# Patient Record
Sex: MALE | Race: ASIAN | NOT HISPANIC OR LATINO
[De-identification: names, ages, dates, MRNs, and addresses within clinical notes are randomized per-mention and may not be internally consistent; named-entity substitution may affect disease eponyms.]

---

## 2023-08-28 PROBLEM — Z00.00 ENCOUNTER FOR PREVENTIVE HEALTH EXAMINATION: Status: ACTIVE | Noted: 2023-08-28

## 2023-09-14 ENCOUNTER — APPOINTMENT (OUTPATIENT)
Dept: THORACIC SURGERY | Facility: CLINIC | Age: 68
End: 2023-09-14
Payer: MEDICARE

## 2023-09-14 VITALS
TEMPERATURE: 97.8 F | RESPIRATION RATE: 16 BRPM | SYSTOLIC BLOOD PRESSURE: 137 MMHG | DIASTOLIC BLOOD PRESSURE: 74 MMHG | HEART RATE: 77 BPM | OXYGEN SATURATION: 98 % | WEIGHT: 129 LBS | HEIGHT: 63.39 IN | BODY MASS INDEX: 22.57 KG/M2

## 2023-09-14 PROCEDURE — 99205 OFFICE O/P NEW HI 60 MIN: CPT

## 2023-09-14 RX ORDER — ERGOCALCIFEROL 1.25 MG/1
1.25 MG CAPSULE, LIQUID FILLED ORAL
Refills: 0 | Status: ACTIVE | COMMUNITY

## 2023-09-14 RX ORDER — BUDESONIDE, GLYCOPYRROLATE, AND FORMOTEROL FUMARATE 160; 9; 4.8 UG/1; UG/1; UG/1
160-9-4.8 AEROSOL, METERED RESPIRATORY (INHALATION)
Refills: 0 | Status: ACTIVE | COMMUNITY

## 2023-09-27 ENCOUNTER — OUTPATIENT (OUTPATIENT)
Dept: OUTPATIENT SERVICES | Facility: HOSPITAL | Age: 68
LOS: 1 days | End: 2023-09-27

## 2023-09-27 VITALS
HEIGHT: 62 IN | SYSTOLIC BLOOD PRESSURE: 140 MMHG | WEIGHT: 130.07 LBS | RESPIRATION RATE: 16 BRPM | HEART RATE: 80 BPM | TEMPERATURE: 97 F | DIASTOLIC BLOOD PRESSURE: 70 MMHG | OXYGEN SATURATION: 99 %

## 2023-09-27 DIAGNOSIS — J45.909 UNSPECIFIED ASTHMA, UNCOMPLICATED: ICD-10-CM

## 2023-09-27 DIAGNOSIS — R91.8 OTHER NONSPECIFIC ABNORMAL FINDING OF LUNG FIELD: ICD-10-CM

## 2023-09-27 RX ORDER — SODIUM CHLORIDE 9 MG/ML
1000 INJECTION, SOLUTION INTRAVENOUS
Refills: 0 | Status: DISCONTINUED | OUTPATIENT
Start: 2023-10-04 | End: 2023-10-18

## 2023-09-27 NOTE — H&P PST ADULT - FUNCTIONAL STATUS
<-- Click to add NO significant Past Surgical History Mets 4 , Walks 3 to 4 blocks, climbs 2 flight of stairs, ADLs

## 2023-09-27 NOTE — H&P PST ADULT - PROBLEM SELECTOR PLAN 1
Patient tentatively scheduled for flexible bronchoscopy endobronchial ultrasound guided biopsy on 10/4/23.  Pre-op instructions provided. Pt given verbal and written instructions with teach back on pepcid. Pt verbalized understanding with return demonstration.  MARY precautions.      Recent labs from 9/18/23 (CBC, CMP, PT, PTT INR) in chart  Recent EKG in chart.  Patient obtained medical evaluation , Copy in chart.

## 2023-10-04 ENCOUNTER — RESULT REVIEW (OUTPATIENT)
Age: 68
End: 2023-10-04

## 2023-10-04 ENCOUNTER — APPOINTMENT (OUTPATIENT)
Dept: THORACIC SURGERY | Facility: HOSPITAL | Age: 68
End: 2023-10-04

## 2023-10-04 ENCOUNTER — OUTPATIENT (OUTPATIENT)
Dept: OUTPATIENT SERVICES | Facility: HOSPITAL | Age: 68
LOS: 1 days | Discharge: HOME | End: 2023-10-04
Payer: MEDICARE

## 2023-10-04 ENCOUNTER — TRANSCRIPTION ENCOUNTER (OUTPATIENT)
Age: 68
End: 2023-10-04

## 2023-10-04 VITALS
WEIGHT: 130.07 LBS | HEART RATE: 62 BPM | SYSTOLIC BLOOD PRESSURE: 127 MMHG | TEMPERATURE: 98 F | OXYGEN SATURATION: 98 % | DIASTOLIC BLOOD PRESSURE: 67 MMHG | HEIGHT: 62 IN | RESPIRATION RATE: 16 BRPM

## 2023-10-04 VITALS
RESPIRATION RATE: 18 BRPM | OXYGEN SATURATION: 100 % | SYSTOLIC BLOOD PRESSURE: 119 MMHG | DIASTOLIC BLOOD PRESSURE: 67 MMHG | HEART RATE: 72 BPM | TEMPERATURE: 97 F

## 2023-10-04 DIAGNOSIS — R91.8 OTHER NONSPECIFIC ABNORMAL FINDING OF LUNG FIELD: ICD-10-CM

## 2023-10-04 PROCEDURE — 31633 BRONCHOSCOPY/NEEDLE BX ADDL: CPT

## 2023-10-04 PROCEDURE — 88173 CYTOPATH EVAL FNA REPORT: CPT | Mod: 26

## 2023-10-04 PROCEDURE — 31624 DX BRONCHOSCOPE/LAVAGE: CPT

## 2023-10-04 PROCEDURE — 88305 TISSUE EXAM BY PATHOLOGIST: CPT | Mod: 26

## 2023-10-04 PROCEDURE — 71045 X-RAY EXAM CHEST 1 VIEW: CPT | Mod: 26

## 2023-10-04 PROCEDURE — 88112 CYTOPATH CELL ENHANCE TECH: CPT | Mod: 26,59

## 2023-10-04 PROCEDURE — 31645 BRNCHSC W/THER ASPIR 1ST: CPT

## 2023-10-04 PROCEDURE — 31629 BRONCHOSCOPY/NEEDLE BX EACH: CPT

## 2023-10-04 PROCEDURE — 31652 BRONCH EBUS SAMPLNG 1/2 NODE: CPT

## 2023-10-04 RX ORDER — HYDROMORPHONE HYDROCHLORIDE 2 MG/ML
0.5 INJECTION INTRAMUSCULAR; INTRAVENOUS; SUBCUTANEOUS
Refills: 0 | Status: DISCONTINUED | OUTPATIENT
Start: 2023-10-04 | End: 2023-10-04

## 2023-10-04 RX ORDER — ONDANSETRON 8 MG/1
4 TABLET, FILM COATED ORAL ONCE
Refills: 0 | Status: DISCONTINUED | OUTPATIENT
Start: 2023-10-04 | End: 2023-10-18

## 2023-10-04 RX ORDER — HYDROMORPHONE HYDROCHLORIDE 2 MG/ML
1 INJECTION INTRAMUSCULAR; INTRAVENOUS; SUBCUTANEOUS
Refills: 0 | Status: DISCONTINUED | OUTPATIENT
Start: 2023-10-04 | End: 2023-10-04

## 2023-10-04 NOTE — BRIEF OPERATIVE NOTE - NSICDXBRIEFPROCEDURE_GEN_ALL_CORE_FT
PROCEDURES:  Bronchoscopy, with EBUS and 1 or 2 lymph node sampling 04-Oct-2023 16:01:51  Kalani Montenegro  Flexible bronchoscopy with bronchoalveolar lavage 04-Oct-2023 16:02:16  Kalani Montenegro

## 2023-10-04 NOTE — ASU DISCHARGE PLAN (ADULT/PEDIATRIC) - CARE PROVIDER_API CALL
Mj Montejo  Thoracic Surgery  [phone] [place] [address] [place] [address]  Phone: [phone]  Fax: [phone]  Follow Up Time: 2 weeks

## 2023-10-04 NOTE — ASU DISCHARGE PLAN (ADULT/PEDIATRIC) - ASU DC SPECIAL INSTRUCTIONSFT
You may cough up blood-tinged sputum in the first 2-3 days after the procedure, this is expected and will subside with time.   If you start to cough up large amounts of sputum, please call Dr. Montejo's office and visit the nearest emergency room for further evaluation.    Please follow up with Dr. Montejo in 2 weeks from the procedure by calling the office to make an appointment.

## 2023-10-04 NOTE — BRIEF OPERATIVE NOTE - COMMENTS
FADUMO Martinez, provided direct first assist support to the surgeon during this surgical procedure. My involvement included positioning, prepping and draping the patient prior to surgery, ensuring clear visibility and exposure for the surgeon by using instruments such as retractors and suction, closing surgical incisions and dressing wounds. As well as other tasks as directed by the surgeon.

## 2023-10-04 NOTE — ASU DISCHARGE PLAN (ADULT/PEDIATRIC) - BATHING
Patient very reluctant to schedule appt for hospital follow up.  Pts dtr brings him to appt so was unable to schedule him until 8/7/18 [facility]
No change

## 2023-10-05 LAB
GRAM STN FLD: SIGNIFICANT CHANGE UP
NIGHT BLUE STAIN TISS: SIGNIFICANT CHANGE UP
SPECIMEN SOURCE: SIGNIFICANT CHANGE UP
SPECIMEN SOURCE: SIGNIFICANT CHANGE UP

## 2023-10-06 LAB — NON-GYNECOLOGICAL CYTOLOGY STUDY: SIGNIFICANT CHANGE UP

## 2023-10-08 LAB
-  CEFTRIAXONE (MENINGITIDIS): SIGNIFICANT CHANGE UP
-  CEFTRIAXONE (NON-MENINGITIDIS): SIGNIFICANT CHANGE UP
-  ERYTHROMYCIN: SIGNIFICANT CHANGE UP
-  LEVOFLOXACIN: SIGNIFICANT CHANGE UP
-  PENICILLIN (MENINGITIDIS): SIGNIFICANT CHANGE UP
-  PENICILLIN (NON-MENINGITIDIS): SIGNIFICANT CHANGE UP
-  PENICILLIN (ORAL PENICILLIN V): SIGNIFICANT CHANGE UP
-  TRIMETHOPRIM/SULFAMETHOXAZOLE: SIGNIFICANT CHANGE UP
-  VANCOMYCIN: SIGNIFICANT CHANGE UP
METHOD TYPE: SIGNIFICANT CHANGE UP

## 2023-10-09 LAB
CULTURE RESULTS: SIGNIFICANT CHANGE UP
ORGANISM # SPEC MICROSCOPIC CNT: SIGNIFICANT CHANGE UP
ORGANISM # SPEC MICROSCOPIC CNT: SIGNIFICANT CHANGE UP
SPECIMEN SOURCE: SIGNIFICANT CHANGE UP

## 2023-10-10 PROBLEM — J45.909 UNSPECIFIED ASTHMA, UNCOMPLICATED: Chronic | Status: ACTIVE | Noted: 2023-09-27

## 2023-10-10 PROBLEM — E78.5 HYPERLIPIDEMIA, UNSPECIFIED: Chronic | Status: ACTIVE | Noted: 2023-09-27

## 2023-10-12 ENCOUNTER — APPOINTMENT (OUTPATIENT)
Dept: THORACIC SURGERY | Facility: CLINIC | Age: 68
End: 2023-10-12
Payer: MEDICARE

## 2023-10-12 VITALS
SYSTOLIC BLOOD PRESSURE: 136 MMHG | RESPIRATION RATE: 18 BRPM | BODY MASS INDEX: 22.75 KG/M2 | WEIGHT: 130 LBS | OXYGEN SATURATION: 96 % | HEART RATE: 86 BPM | DIASTOLIC BLOOD PRESSURE: 74 MMHG | TEMPERATURE: 97.5 F

## 2023-10-12 PROCEDURE — 99214 OFFICE O/P EST MOD 30 MIN: CPT

## 2023-11-04 LAB
CULTURE RESULTS: SIGNIFICANT CHANGE UP
CULTURE RESULTS: SIGNIFICANT CHANGE UP
SPECIMEN SOURCE: SIGNIFICANT CHANGE UP
SPECIMEN SOURCE: SIGNIFICANT CHANGE UP

## 2024-01-04 ENCOUNTER — APPOINTMENT (OUTPATIENT)
Dept: THORACIC SURGERY | Facility: CLINIC | Age: 69
End: 2024-01-04
Payer: MEDICARE

## 2024-01-04 ENCOUNTER — NON-APPOINTMENT (OUTPATIENT)
Age: 69
End: 2024-01-04

## 2024-01-04 VITALS
OXYGEN SATURATION: 96 % | RESPIRATION RATE: 18 BRPM | HEART RATE: 84 BPM | WEIGHT: 134 LBS | TEMPERATURE: 96.8 F | HEIGHT: 63.39 IN | DIASTOLIC BLOOD PRESSURE: 76 MMHG | BODY MASS INDEX: 23.45 KG/M2 | SYSTOLIC BLOOD PRESSURE: 157 MMHG

## 2024-01-04 DIAGNOSIS — R91.8 OTHER NONSPECIFIC ABNORMAL FINDING OF LUNG FIELD: ICD-10-CM

## 2024-01-04 DIAGNOSIS — R59.0 LOCALIZED ENLARGED LYMPH NODES: ICD-10-CM

## 2024-01-04 PROCEDURE — 99214 OFFICE O/P EST MOD 30 MIN: CPT

## 2024-01-05 NOTE — DATA REVIEWED
[FreeTextEntry1] : I have independently reviewed the following: [place] Chest on 8[place]21[place]23 [place] [facility] [place]s on 08/25/2023: FVC 71%, FEV1 51%, DLCO 116% PET/CT on 09/20/2023 path on 10/04/2023

## 2024-01-05 NOTE — CONSULT LETTER
[FreeTextEntry2] : Dr. Heath Yepez (PCP/Referring) Dr. Mike Nesbitt (Pulm) [FreeTextEntry3] : Mj Montejo MD, MPH  System Director of Thoracic Surgery  Director of Comprehensive Lung and Foregut [place]  Professor Cardiovascular & Thoracic Surgery   [facility] [employer] [facility] at [facility] [phone] [place] [address] [place] [address] Tel: [phone] Fax: [phone]

## 2024-01-05 NOTE — ASSESSMENT
[FreeTextEntry1] : Mr. PETER ALANIS, 68 year old male, former smoker (1ppd since age 14, quit July 2023), w/ no significant PMHx, who presented with lung nodules, referred by PCP Dr. Heath Yepez.   I have reviewed the patient's medical records and diagnostic images at time of this office consultation and have made the following recommendation: 1. I have reviewed his PFTs and CT and PET scans again, mediastinal LNs were negative from the Flex Bronch EBUS biopsy back in Oct, thus he likely has early stage lung malignancy, and tumor resection remains the Gold Standard, I recommended a repeat CT Chest w/o contrast before scheduling his surgery since last scan was in Aug/Sept. I will then schedule a Rt VATS R.A. RLL wedge rxn, MLND on 1/29/24. Risks and benefits and alternatives explained to patient, all questions answered, patient agreed to proceed with surgery. 2. Medical clearance and PST.   I, RAMÓN Vicente, personally performed the evaluation and management (E/M) services for this established patient who presents today with (a) new problem(s)/exacerbation of (an) existing condition(s). That E/M includes conducting the examination, assessing all new/exacerbated conditions, and establishing a new plan of care. Today, my ACP, Aric Morrell NP was here to observe my evaluation and management services for this new problem/exacerbated condition to be followed going forward.

## 2024-01-05 NOTE — HISTORY OF PRESENT ILLNESS
[FreeTextEntry1] : Mr. PETER ALANIS, 68 year old male, former smoker (1ppd since age 14, quit July 2023), w/ no significant PMHx, who presented with lung nodules. CT lung cancer screening ordered due to smoking hx, referred by PCP Dr. Heath Yepez.  [place] 8[place]21[place]23 [place] [facility] [place] - mildly enlarged mediastinal lymph nodes unchanged:  subcarinal lymph node measuring 1.5 cm; a 1.3 cm precarinal lymph node (2: 56). There is no hilar lymphadenopathy. - numerous small lung nodules in the lung apices, measuring up to 4 mm (3: 27) they are not significantly changed in size and number, likely postinflammatory/infectious.  - small subpleural nodule seen more inferiorly in the upper lobes:  stable 3 mm nodule CL (3: 56) - a faint 8 mm subsolid RLL nodule (3: 161), predominantly groundglass with a punctate nodular component, previously measuring 6 mm, increased in size and density, suspicious for low-grade malignancy - mild emphysema - a hepatic cyst - No suspicious lytic or blastic lesion is identified.  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 Although nonspecific these are typical of inflammation associated with prior granulomatous disease. - Again noted is an 8 mm subsolid pulmonary nodule in the right lower lobe on image 74, no significant uptake on PET. - Again noted are multiple subcentimeter pulmonary nodules measuring up to 4 mm. These are too small for the resolution of PET although likely inflammatory in nature  Patient is s/p FB with BAL of RLL bronchus. EBUS, FNA of right level IV LNs. FNA of Level VII LNs. FB with aspiration of secretions on 10/04/2023. Path of 4R, Level 7 LN and RLL BAL revealed negative for malignant cells.   Last seen 10/12/23, I recommended a Rt VATS R.A. RLL wedge rxn, MLND, but patient is returning to [place] next week and returning to [place] in Feb 2024, I recommended patient to repeat CT Chest w/o contrast in Feb 2024, then RTC to discuss and set a date for surgery.  *Patient decided not to go back to [place], wants to proceed with surgery now. Patient is here today for a follow up. Denies SOB, CP, cough.

## 2024-01-18 ENCOUNTER — OUTPATIENT (OUTPATIENT)
Dept: OUTPATIENT SERVICES | Facility: HOSPITAL | Age: 69
LOS: 1 days | End: 2024-01-18
Payer: MEDICARE

## 2024-01-18 VITALS
SYSTOLIC BLOOD PRESSURE: 133 MMHG | HEART RATE: 83 BPM | OXYGEN SATURATION: 95 % | TEMPERATURE: 97 F | RESPIRATION RATE: 16 BRPM | DIASTOLIC BLOOD PRESSURE: 85 MMHG | WEIGHT: 130.95 LBS | HEIGHT: 62.5 IN

## 2024-01-18 DIAGNOSIS — Z91.89 OTHER SPECIFIED PERSONAL RISK FACTORS, NOT ELSEWHERE CLASSIFIED: ICD-10-CM

## 2024-01-18 DIAGNOSIS — R91.8 OTHER NONSPECIFIC ABNORMAL FINDING OF LUNG FIELD: ICD-10-CM

## 2024-01-18 DIAGNOSIS — Z98.890 OTHER SPECIFIED POSTPROCEDURAL STATES: Chronic | ICD-10-CM

## 2024-01-18 DIAGNOSIS — I10 ESSENTIAL (PRIMARY) HYPERTENSION: ICD-10-CM

## 2024-01-18 DIAGNOSIS — J45.909 UNSPECIFIED ASTHMA, UNCOMPLICATED: ICD-10-CM

## 2024-01-18 LAB
BLD GP AB SCN SERPL QL: NEGATIVE — SIGNIFICANT CHANGE UP
RH IG SCN BLD-IMP: POSITIVE — SIGNIFICANT CHANGE UP
RH IG SCN BLD-IMP: POSITIVE — SIGNIFICANT CHANGE UP

## 2024-01-18 RX ORDER — SODIUM CHLORIDE 9 MG/ML
1000 INJECTION, SOLUTION INTRAVENOUS
Refills: 0 | Status: DISCONTINUED | OUTPATIENT
Start: 2024-01-29 | End: 2024-01-30

## 2024-01-18 NOTE — H&P PST ADULT - RESPIRATORY AND THORAX COMMENTS
asthma - no recent exacerbations, last used Breztri about 2-3 months ago, preop dx other nonspecific abnormal finding of lung field

## 2024-01-18 NOTE — H&P PST ADULT - HISTORY OF PRESENT ILLNESS
70 y/o male PMH HTN HLD prediabetes asthma presents to presurgical testing with diagnosis of other nonspecific abnormal finding of lung field. Pt with history of pulmonary nodules. Pt is scheduled for a right video assisted thoracoscopy robotic assisted wedge resection of right lower lobe mediastinal lymph node dissection.

## 2024-01-18 NOTE — H&P PST ADULT - NSANTHTOTALSCORECAL_ENT_A_CORE
Detail Level: Zone
Plan: due to active hair growth, no tx is needed today. Discussed etiologies/prognosis/and treatment plan w/ patient and patient's wife.
4

## 2024-01-18 NOTE — H&P PST ADULT - PROBLEM SELECTOR PLAN 1
Patient tentatively scheduled for right video assisted thoracoscopy robotic assisted wedge resection of right lower lobe mediastinal lymph node dissection for 1/29/24. Pre-op instructions provided via Mandarin . Pt given verbal and written instructions with teach back on chlorhexidine shampoo (given Mandarin handout) and pepcid. Pt verbalized understanding with return demonstration.    Copy of medical and cardiac evaluation in chart with labs CBC CMP UA COAGS EKG from 1/6/24  Pending copy of recent echo and stress test.    T&S/ABO done

## 2024-01-18 NOTE — H&P PST ADULT - NSICDXPASTMEDICALHX_GEN_ALL_CORE_FT
PAST MEDICAL HISTORY:  Asthma     HLD (hyperlipidemia)     HTN (hypertension)     Prediabetes     Pulmonary nodules

## 2024-01-26 NOTE — ASU PATIENT PROFILE, ADULT - PATIENT REPRESENTATIVE NAME
H&P reviewed  After examining the patient I find no changes in the patients condition since the H&P had been written      Vitals:    09/23/20 1028   BP: 133/64   Pulse: 82   Resp: 18   Temp: 99 °F (37 2 °C)   SpO2: 95%
félix/daughter

## 2024-01-28 ENCOUNTER — TRANSCRIPTION ENCOUNTER (OUTPATIENT)
Age: 69
End: 2024-01-28

## 2024-01-29 ENCOUNTER — TRANSCRIPTION ENCOUNTER (OUTPATIENT)
Age: 69
End: 2024-01-29

## 2024-01-29 ENCOUNTER — RESULT REVIEW (OUTPATIENT)
Age: 69
End: 2024-01-29

## 2024-01-29 ENCOUNTER — INPATIENT (INPATIENT)
Facility: HOSPITAL | Age: 69
LOS: 0 days | Discharge: HOME | End: 2024-01-30
Attending: THORACIC SURGERY (CARDIOTHORACIC VASCULAR SURGERY) | Admitting: THORACIC SURGERY (CARDIOTHORACIC VASCULAR SURGERY)
Payer: MEDICARE

## 2024-01-29 ENCOUNTER — APPOINTMENT (OUTPATIENT)
Dept: THORACIC SURGERY | Facility: HOSPITAL | Age: 69
End: 2024-01-29

## 2024-01-29 VITALS
RESPIRATION RATE: 16 BRPM | SYSTOLIC BLOOD PRESSURE: 130 MMHG | HEART RATE: 71 BPM | HEIGHT: 62.5 IN | WEIGHT: 130.95 LBS | DIASTOLIC BLOOD PRESSURE: 81 MMHG | TEMPERATURE: 98 F | OXYGEN SATURATION: 99 %

## 2024-01-29 DIAGNOSIS — Z98.890 OTHER SPECIFIED POSTPROCEDURAL STATES: Chronic | ICD-10-CM

## 2024-01-29 DIAGNOSIS — R91.8 OTHER NONSPECIFIC ABNORMAL FINDING OF LUNG FIELD: ICD-10-CM

## 2024-01-29 PROCEDURE — 32652 THORACOSCOPY REM TOTL CORTEX: CPT | Mod: AS,RT

## 2024-01-29 PROCEDURE — 32666 THORACOSCOPY W/WEDGE RESECT: CPT | Mod: RT

## 2024-01-29 PROCEDURE — 32667 THORACOSCOPY W/W RESECT ADDL: CPT | Mod: RT

## 2024-01-29 PROCEDURE — 32652 THORACOSCOPY REM TOTL CORTEX: CPT | Mod: RT

## 2024-01-29 PROCEDURE — 88305 TISSUE EXAM BY PATHOLOGIST: CPT | Mod: 26

## 2024-01-29 PROCEDURE — 32666 THORACOSCOPY W/WEDGE RESECT: CPT | Mod: AS,RT

## 2024-01-29 PROCEDURE — 88307 TISSUE EXAM BY PATHOLOGIST: CPT | Mod: 26

## 2024-01-29 PROCEDURE — S2900 ROBOTIC SURGICAL SYSTEM: CPT | Mod: NC

## 2024-01-29 PROCEDURE — 71045 X-RAY EXAM CHEST 1 VIEW: CPT | Mod: 26

## 2024-01-29 PROCEDURE — 32674 THORACOSCOPY LYMPH NODE EXC: CPT | Mod: AS

## 2024-01-29 PROCEDURE — 99233 SBSQ HOSP IP/OBS HIGH 50: CPT

## 2024-01-29 PROCEDURE — 32674 THORACOSCOPY LYMPH NODE EXC: CPT

## 2024-01-29 PROCEDURE — 32667 THORACOSCOPY W/W RESECT ADDL: CPT | Mod: AS,RT

## 2024-01-29 DEVICE — STAPLER COVIDIEN TRI-STAPLE 45MM BLACK RELOAD: Type: IMPLANTABLE DEVICE | Status: FUNCTIONAL

## 2024-01-29 DEVICE — STAPLER COVIDIEN TRI-STAPLE 45MM BLACK INTELLIGENT RELOAD: Type: IMPLANTABLE DEVICE | Status: FUNCTIONAL

## 2024-01-29 DEVICE — SURGICEL 2 X 14": Type: IMPLANTABLE DEVICE | Status: FUNCTIONAL

## 2024-01-29 DEVICE — CHEST DRAIN THORACIC ARGYLE PVC 28FR STRAIGHT: Type: IMPLANTABLE DEVICE | Status: FUNCTIONAL

## 2024-01-29 DEVICE — STAPLER COVIDIEN TRI-STAPLE 60MM BLACK RELOAD: Type: IMPLANTABLE DEVICE | Status: FUNCTIONAL

## 2024-01-29 DEVICE — STAPLER COVIDIEN TRI-STAPLE 45MM PURPLE RELOAD: Type: IMPLANTABLE DEVICE | Status: FUNCTIONAL

## 2024-01-29 RX ORDER — POLYETHYLENE GLYCOL 3350 17 G/17G
17 POWDER, FOR SOLUTION ORAL DAILY
Refills: 0 | Status: DISCONTINUED | OUTPATIENT
Start: 2024-01-29 | End: 2024-01-30

## 2024-01-29 RX ORDER — ALBUTEROL 90 UG/1
2 AEROSOL, METERED ORAL
Refills: 0 | DISCHARGE

## 2024-01-29 RX ORDER — HYDROMORPHONE HYDROCHLORIDE 2 MG/ML
0.5 INJECTION INTRAMUSCULAR; INTRAVENOUS; SUBCUTANEOUS
Refills: 0 | Status: DISCONTINUED | OUTPATIENT
Start: 2024-01-29 | End: 2024-01-30

## 2024-01-29 RX ORDER — ERGOCALCIFEROL 1.25 MG/1
0 CAPSULE ORAL
Refills: 0 | DISCHARGE

## 2024-01-29 RX ORDER — HEPARIN SODIUM 5000 [USP'U]/ML
5000 INJECTION INTRAVENOUS; SUBCUTANEOUS EVERY 12 HOURS
Refills: 0 | Status: DISCONTINUED | OUTPATIENT
Start: 2024-01-29 | End: 2024-01-30

## 2024-01-29 RX ORDER — ALBUTEROL 90 UG/1
2 AEROSOL, METERED ORAL EVERY 6 HOURS
Refills: 0 | Status: DISCONTINUED | OUTPATIENT
Start: 2024-01-29 | End: 2024-01-30

## 2024-01-29 RX ORDER — HYDROMORPHONE HYDROCHLORIDE 2 MG/ML
30 INJECTION INTRAMUSCULAR; INTRAVENOUS; SUBCUTANEOUS
Refills: 0 | Status: DISCONTINUED | OUTPATIENT
Start: 2024-01-29 | End: 2024-01-30

## 2024-01-29 RX ORDER — SENNA PLUS 8.6 MG/1
2 TABLET ORAL AT BEDTIME
Refills: 0 | Status: DISCONTINUED | OUTPATIENT
Start: 2024-01-29 | End: 2024-01-30

## 2024-01-29 RX ORDER — LOSARTAN POTASSIUM 100 MG/1
1 TABLET, FILM COATED ORAL
Refills: 0 | DISCHARGE

## 2024-01-29 RX ORDER — NALOXONE HYDROCHLORIDE 4 MG/.1ML
0.1 SPRAY NASAL
Refills: 0 | Status: DISCONTINUED | OUTPATIENT
Start: 2024-01-29 | End: 2024-01-30

## 2024-01-29 RX ORDER — ACETAMINOPHEN 500 MG
975 TABLET ORAL ONCE
Refills: 0 | Status: COMPLETED | OUTPATIENT
Start: 2024-01-29 | End: 2024-01-29

## 2024-01-29 RX ORDER — ONDANSETRON 8 MG/1
4 TABLET, FILM COATED ORAL EVERY 6 HOURS
Refills: 0 | Status: DISCONTINUED | OUTPATIENT
Start: 2024-01-29 | End: 2024-01-30

## 2024-01-29 RX ORDER — ACETAMINOPHEN 500 MG
1000 TABLET ORAL ONCE
Refills: 0 | Status: COMPLETED | OUTPATIENT
Start: 2024-01-29 | End: 2024-01-29

## 2024-01-29 RX ORDER — HEPARIN SODIUM 5000 [USP'U]/ML
5000 INJECTION INTRAVENOUS; SUBCUTANEOUS ONCE
Refills: 0 | Status: COMPLETED | OUTPATIENT
Start: 2024-01-29 | End: 2024-01-29

## 2024-01-29 RX ADMIN — HYDROMORPHONE HYDROCHLORIDE 30 MILLILITER(S): 2 INJECTION INTRAMUSCULAR; INTRAVENOUS; SUBCUTANEOUS at 20:02

## 2024-01-29 RX ADMIN — Medication 400 MILLIGRAM(S): at 22:57

## 2024-01-29 RX ADMIN — Medication 1000 MILLIGRAM(S): at 23:27

## 2024-01-29 RX ADMIN — SODIUM CHLORIDE 30 MILLILITER(S): 9 INJECTION, SOLUTION INTRAVENOUS at 11:06

## 2024-01-29 RX ADMIN — SENNA PLUS 2 TABLET(S): 8.6 TABLET ORAL at 22:57

## 2024-01-29 RX ADMIN — ALBUTEROL 2 PUFF(S): 90 AEROSOL, METERED ORAL at 22:36

## 2024-01-29 RX ADMIN — Medication 975 MILLIGRAM(S): at 11:06

## 2024-01-29 RX ADMIN — HEPARIN SODIUM 5000 UNIT(S): 5000 INJECTION INTRAVENOUS; SUBCUTANEOUS at 22:57

## 2024-01-29 RX ADMIN — SODIUM CHLORIDE 30 MILLILITER(S): 9 INJECTION, SOLUTION INTRAVENOUS at 20:01

## 2024-01-29 RX ADMIN — HEPARIN SODIUM 5000 UNIT(S): 5000 INJECTION INTRAVENOUS; SUBCUTANEOUS at 11:06

## 2024-01-29 NOTE — BRIEF OPERATIVE NOTE - NSICDXBRIEFPROCEDURE_GEN_ALL_CORE_FT
PROCEDURES:  Flexible bronchoscopy 29-Jan-2024 18:36:46  Angie Garcia  Wedge resection, lung, robot-assisted, using VATS 29-Jan-2024 18:36:59  Angie Garcia

## 2024-01-29 NOTE — PATIENT PROFILE ADULT - FALL HARM RISK - HARM RISK INTERVENTIONS
No Assistance with ambulation/Assistance OOB with selected safe patient handling equipment/Communicate Risk of Fall with Harm to all staff/Discuss with provider need for PT consult/Monitor gait and stability/Provide patient with walking aids - walker, cane, crutches/Reinforce activity limits and safety measures with patient and family/Sit up slowly, dangle for a short time, stand at bedside before walking/Tailored Fall Risk Interventions/Use of alarms - bed, chair and/or voice tab/Visual Cue: Yellow wristband and red socks/Bed in lowest position, wheels locked, appropriate side rails in place/Call bell, personal items and telephone in reach/Instruct patient to call for assistance before getting out of bed or chair/Non-slip footwear when patient is out of bed/[place] to call system/Physically safe environment - no spills, clutter or unnecessary equipment/Purposeful Proactive Rounding/Room/bathroom lighting operational, light cord in reach

## 2024-01-29 NOTE — ASU PREOP CHECKLIST - 4.
Report from Rain @ 13:00 Report from Rain @ 13:00. report to Carolin @ 15:10 Report from Rain @ 13:00. report to Carolin @ 15:10  used [facility] ID #[id number]

## 2024-01-30 ENCOUNTER — NON-APPOINTMENT (OUTPATIENT)
Age: 69
End: 2024-01-30

## 2024-01-30 ENCOUNTER — TRANSCRIPTION ENCOUNTER (OUTPATIENT)
Age: 69
End: 2024-01-30

## 2024-01-30 VITALS
DIASTOLIC BLOOD PRESSURE: 71 MMHG | SYSTOLIC BLOOD PRESSURE: 118 MMHG | TEMPERATURE: 98 F | OXYGEN SATURATION: 94 % | RESPIRATION RATE: 20 BRPM | HEART RATE: 95 BPM

## 2024-01-30 LAB
ANION GAP SERPL CALC-SCNC: 15 MMOL/L — HIGH (ref 7–14)
BASOPHILS # BLD AUTO: 0.01 K/UL — SIGNIFICANT CHANGE UP (ref 0–0.2)
BASOPHILS NFR BLD AUTO: 0.1 % — SIGNIFICANT CHANGE UP (ref 0–2)
BLD GP AB SCN SERPL QL: NEGATIVE — SIGNIFICANT CHANGE UP
BUN SERPL-MCNC: 21 MG/DL — SIGNIFICANT CHANGE UP (ref 7–23)
CALCIUM SERPL-MCNC: 8.7 MG/DL — SIGNIFICANT CHANGE UP (ref 8.4–10.5)
CHLORIDE SERPL-SCNC: 101 MMOL/L — SIGNIFICANT CHANGE UP (ref 98–107)
CO2 SERPL-SCNC: 22 MMOL/L — SIGNIFICANT CHANGE UP (ref 22–31)
CREAT SERPL-MCNC: 1.05 MG/DL — SIGNIFICANT CHANGE UP (ref 0.5–1.3)
EGFR: 76 ML/MIN/1.73M2 — SIGNIFICANT CHANGE UP
EOSINOPHIL # BLD AUTO: 0.01 K/UL — SIGNIFICANT CHANGE UP (ref 0–0.5)
EOSINOPHIL NFR BLD AUTO: 0.1 % — SIGNIFICANT CHANGE UP (ref 0–6)
GLUCOSE SERPL-MCNC: 151 MG/DL — HIGH (ref 70–99)
HCT VFR BLD CALC: 46 % — SIGNIFICANT CHANGE UP (ref 39–50)
HGB BLD-MCNC: 15.2 G/DL — SIGNIFICANT CHANGE UP (ref 13–17)
IANC: 9.25 K/UL — HIGH (ref 1.8–7.4)
IMM GRANULOCYTES NFR BLD AUTO: 0.3 % — SIGNIFICANT CHANGE UP (ref 0–0.9)
LYMPHOCYTES # BLD AUTO: 0.54 K/UL — LOW (ref 1–3.3)
LYMPHOCYTES # BLD AUTO: 5.3 % — LOW (ref 13–44)
MAGNESIUM SERPL-MCNC: 2.1 MG/DL — SIGNIFICANT CHANGE UP (ref 1.6–2.6)
MCHC RBC-ENTMCNC: 29.8 PG — SIGNIFICANT CHANGE UP (ref 27–34)
MCHC RBC-ENTMCNC: 33 GM/DL — SIGNIFICANT CHANGE UP (ref 32–36)
MCV RBC AUTO: 90.2 FL — SIGNIFICANT CHANGE UP (ref 80–100)
MONOCYTES # BLD AUTO: 0.41 K/UL — SIGNIFICANT CHANGE UP (ref 0–0.9)
MONOCYTES NFR BLD AUTO: 4 % — SIGNIFICANT CHANGE UP (ref 2–14)
NEUTROPHILS # BLD AUTO: 9.25 K/UL — HIGH (ref 1.8–7.4)
NEUTROPHILS NFR BLD AUTO: 90.2 % — HIGH (ref 43–77)
NRBC # BLD: 0 /100 WBCS — SIGNIFICANT CHANGE UP (ref 0–0)
NRBC # FLD: 0 K/UL — SIGNIFICANT CHANGE UP (ref 0–0)
PHOSPHATE SERPL-MCNC: 3.3 MG/DL — SIGNIFICANT CHANGE UP (ref 2.5–4.5)
PLATELET # BLD AUTO: 219 K/UL — SIGNIFICANT CHANGE UP (ref 150–400)
POTASSIUM SERPL-MCNC: 5 MMOL/L — SIGNIFICANT CHANGE UP (ref 3.5–5.3)
POTASSIUM SERPL-SCNC: 5 MMOL/L — SIGNIFICANT CHANGE UP (ref 3.5–5.3)
RBC # BLD: 5.1 M/UL — SIGNIFICANT CHANGE UP (ref 4.2–5.8)
RBC # FLD: 12.6 % — SIGNIFICANT CHANGE UP (ref 10.3–14.5)
RH IG SCN BLD-IMP: POSITIVE — SIGNIFICANT CHANGE UP
SODIUM SERPL-SCNC: 138 MMOL/L — SIGNIFICANT CHANGE UP (ref 135–145)
WBC # BLD: 10.25 K/UL — SIGNIFICANT CHANGE UP (ref 3.8–10.5)
WBC # FLD AUTO: 10.25 K/UL — SIGNIFICANT CHANGE UP (ref 3.8–10.5)

## 2024-01-30 PROCEDURE — 99233 SBSQ HOSP IP/OBS HIGH 50: CPT

## 2024-01-30 PROCEDURE — 71045 X-RAY EXAM CHEST 1 VIEW: CPT | Mod: 26

## 2024-01-30 RX ORDER — ACETAMINOPHEN 500 MG
650 TABLET ORAL EVERY 6 HOURS
Refills: 0 | Status: DISCONTINUED | OUTPATIENT
Start: 2024-01-30 | End: 2024-01-30

## 2024-01-30 RX ORDER — SENNA PLUS 8.6 MG/1
2 TABLET ORAL
Qty: 0 | Refills: 0 | DISCHARGE
Start: 2024-01-30

## 2024-01-30 RX ORDER — OXYCODONE HYDROCHLORIDE 5 MG/1
2.5 TABLET ORAL
Refills: 0 | Status: DISCONTINUED | OUTPATIENT
Start: 2024-01-30 | End: 2024-01-30

## 2024-01-30 RX ORDER — POLYETHYLENE GLYCOL 3350 17 G/17G
17 POWDER, FOR SOLUTION ORAL
Qty: 0 | Refills: 0 | DISCHARGE
Start: 2024-01-30

## 2024-01-30 RX ORDER — OXYCODONE HYDROCHLORIDE 5 MG/1
5 TABLET ORAL
Refills: 0 | Status: DISCONTINUED | OUTPATIENT
Start: 2024-01-30 | End: 2024-01-30

## 2024-01-30 RX ORDER — OXYCODONE HYDROCHLORIDE 5 MG/1
1 TABLET ORAL
Qty: 20 | Refills: 0
Start: 2024-01-30 | End: 2024-02-03

## 2024-01-30 RX ADMIN — ALBUTEROL 2 PUFF(S): 90 AEROSOL, METERED ORAL at 15:03

## 2024-01-30 RX ADMIN — HEPARIN SODIUM 5000 UNIT(S): 5000 INJECTION INTRAVENOUS; SUBCUTANEOUS at 05:43

## 2024-01-30 RX ADMIN — Medication 650 MILLIGRAM(S): at 12:03

## 2024-01-30 RX ADMIN — ALBUTEROL 2 PUFF(S): 90 AEROSOL, METERED ORAL at 10:09

## 2024-01-30 RX ADMIN — ALBUTEROL 2 PUFF(S): 90 AEROSOL, METERED ORAL at 03:45

## 2024-01-30 NOTE — DISCHARGE NOTE PROVIDER - NSDCMRMEDTOKEN_GEN_ALL_CORE_FT
Albuterol (Eqv-Proventil HFA) 90 mcg/inh inhalation aerosol: 2 puff(s) inhaled 4 times a day as needed  Calcium + vitamin D: 600 - 400 orally 2 times a day  losartan 25 mg oral tablet: 1 tab(s) orally once a day  Vitamin D: 5000 int unit orally once a week   Albuterol (Eqv-Proventil HFA) 90 mcg/inh inhalation aerosol: 2 puff(s) inhaled 4 times a day as needed  Calcium + vitamin D: 600 - 400 orally 2 times a day  Chest Xray AP/PA: Please use this prescription should you choose to go a separate outpatient imaging facility of your choice  to have your follow up chest Xray completed. Please know that should you choose to go to an outpatient imaging facility of your choice that your follow up chest Xray should not be completed any earlier than two days prior to to he day of your follow up appointment. Should you choose to have your follow up chest Xray completed at [facility] on the same day as your appointment, please arrive early and notify .  Please also give patient copy of chest Xray CD.    Please fax results to  Dr. Montejo @[phone]  ICD: R91.8  losartan 25 mg oral tablet: 1 tab(s) orally once a day  oxyCODONE 5 mg oral tablet: 1 tab(s) orally every 6 hours as needed for Moderate to Severe Pain MDD: 4 tabs  polyethylene glycol 3350 oral powder for reconstitution: 17 gram(s) orally once a day  senna leaf extract oral tablet: 2 tab(s) orally once a day (at bedtime)  Vitamin D: 5000 int unit orally once a week

## 2024-01-30 NOTE — DISCHARGE NOTE PROVIDER - HOSPITAL COURSE
69M who is a smoker recently quit is s/p FB, R VATS, robotic lysis of adhesions, RLL wedge x2, MLND on 1/29/24. Chest tube was removed once output was minimal and no airleak noted. Post removal xray reviewed. Patient is stable for discharge with continued outpatient follow up. 69M who is a smoker recently quit is s/p FB, R VATS, robotic lysis of adhesions, RLL wedge x2, MLND on 1/29/24. Chest tube was removed once output was minimal and no air leak noted. Post removal xray reviewed.     At this point in time patient is stable for discharge to home per patient's attending. Patient will follow up with Dr. Montejo within 1-2 weeks.

## 2024-01-30 NOTE — DISCHARGE NOTE NURSING/CASE MANAGEMENT/SOCIAL WORK - NSTOBACCONEVERSMOKERY/N_GEN_A
Health Maintenance Due   Topic Date Due   • Diabetes Eye Exam  09[birth year]29/1972   • Diabetes A1C  09/29/1972   • Diabetes GFR  09/29/1972   • Diabetes Foot Exam  09/29/1972   • Colorectal Cancer Screening-Colonoscopy  09/29/2004   • Shingles Vaccine (1 of 2) 09/29/2004   • Hepatitis C Screening  09/29/2005   • Medicare Wellness 65+  09/29/2019       Patient is due for the topics as listed above and wishes to proceed with them. Orders placed for Diabetes A1C, chem,hep c,  Discuss colon bianca    Has annual eye exam September  Patient not on medicare.          
Yes

## 2024-01-30 NOTE — DISCHARGE NOTE PROVIDER - NSDCQMSTROKE_NEU_ALL_CORE
Psychology Initial Consultation    Patient Name: Flex Pickering  MRN: [id number]  Admission Date: 10/25/2023  Today's date: 11/21/2023    Reason for Consult: low mood    HPI:   Flex Pickering is a 77 y.o.   male with H/O DLBCL with CNS involvement here for autologous stem cell transplant (Day +18). Patient has a long history of generalized anxiety with more recent depressed mood. Subjective History:    Wife is concerned with patient's cognitive functioning. She was hopeful that his cognition would improve after transplant and is hoping that the recent declines in cognition will improve. Patient reports being discouraged and thinking that he would feel better by now. Reoriented patient to appropriate expectations during transplant. Encouraged patient to actively participate in his care related to nutrition and physical activity. Current outpatient psychiatric treatment: Sertraline 100mg  Other relevant medications: Keppra (500mg, BID)    PSYCHOSOCIAL HISTORY     Marital Status:  for 48 years              If , describes marriage as: \"positive\"  Race/Ethnicity: \"\"  Lives: [place] (approximately [place] minutes from [facility] [employer]) and lives with wife  Housing concerns: Denies  Transportation concerns: Denies  Children: 4 adult children   Employment: retired since 3/2023 approximately, forced into intermediate due to health  Financial concerns related to this procedure: Denies  Childhood history: He was raised by mother and father in [place], [place] and has 1 sibling. Flex Pickering describes his childhood as \"positive\". Educational history: Denies difficulties in school (e.g., learning disability, accommodations). Highest level of education completed is 2 years college.    Sikh/spiritual beliefs: Sikh and Denies Bahai beliefs that would affect his medical care   service: Denies  Legal history (e.g. DUI/DWI, group home/[facility], arrests, probation/parole, (0) independent No

## 2024-01-30 NOTE — DISCHARGE NOTE NURSING/CASE MANAGEMENT/SOCIAL WORK - PATIENT PORTAL LINK FT
PAST SURGICAL HISTORY:  No significant past surgical history      You can access the FollowMyHealth Patient Portal offered by [facility] by registering at the following website: [email or web address]. By joining [employer]’s FollowMyHealth portal, you will also be able to view your health information using other applications (apps) compatible with our system.

## 2024-01-30 NOTE — DISCHARGE NOTE PROVIDER - NSDCCPCAREPLAN_GEN_ALL_CORE_FT
PRINCIPAL DISCHARGE DIAGNOSIS  Diagnosis: Other nonspecific abnormal finding of lung field  Assessment and Plan of Treatment:      PRINCIPAL DISCHARGE DIAGNOSIS  Diagnosis: Other nonspecific abnormal finding of lung field  Assessment and Plan of Treatment: - Follow up with Dr. Montejo in 1-2 weeks [phone]. Please call the office to make an appointment.   - Have your Chest x-ray done 1-2 days prior to your appointment.    - Please bring copy of x-ray to your appointment.  - Follow up with primary care provider in one week.  - Take the prescribed pain medication ONLY as needed.  - Can use over the counter Ibuprofen & or Tylenol as directed on the bottle.   - Please take a laxative or stool softeners to help support your bowel movements while on narcotic pain medication as it can cause constipation. Laxatives & stool softeners can be available over the counter at your local pharmacy.

## 2024-01-30 NOTE — DISCHARGE NOTE PROVIDER - NSDCCPTREATMENT_GEN_ALL_CORE_FT
PRINCIPAL PROCEDURE  Procedure: Wedge resection, lung, robot-assisted, using VATS  Findings and Treatment:       SECONDARY PROCEDURE  Procedure: Flexible bronchoscopy  Findings and Treatment:

## 2024-01-30 NOTE — DISCHARGE NOTE PROVIDER - NSDCFUADDINST_GEN_ALL_CORE_FT
Follow up with Dr. Montejo in 10-14 days. Call Thoracic Surgery office [phone] to schedule an appointment. Please, obtain a CXR 1-2 days prior to your appointment and bring a copy with you.  Please, make an appointment with your Primary care provider.  You may shower in 24 hours with dressing and remove in the shower.  Keep the wound clean and dry it well after showering.  It’s OK if some fluid comes out of the chest tube hole unless blood or purulent. Blue Stitch will be removed by Dr. Montejo in the office. No driving while taking pain meds. Some coughing and discomfort is expected.  You can remove chest tube dressing for  shower and replace with band-aid after showering if you think it is necessary otherwise leave it open it air.    Your chest tube sites may ooze a little, simply keep the site clean with soap and water and place small dressing or band-aid.  Call your doctor if the drainage is purulent or pus like or if drainage is increasing.    Do not be alarmed with shoulder, neck or back pain.  This could be due to your positioning on the operating table and is muscular pain. Call your doctor for signs of wound infection such as wide area of redness beyond the edges of your incision, pus coming from incisional or drainage tube site, unusual or new swelling and fever greater than 101 degrees.  Go to ER for prolonged shortness of breath that gets worse or sudden onset or severe shortness of breath that does not get better with rest.

## 2024-01-30 NOTE — DISCHARGE NOTE NURSING/CASE MANAGEMENT/SOCIAL WORK - NSDCFUADDAPPT_GEN_ALL_CORE_FT
- Please call [phone] to schedule your follow up appointment with Dr. Montejo for one week from now  - Please call your primary care provider/family doctor to also schedule a follow up appointment

## 2024-01-30 NOTE — DISCHARGE NOTE PROVIDER - NSDCFUADDAPPT_GEN_ALL_CORE_FT
- Please call [phone] to schedule your follow up appointment with Dr. Montejo for one week from now  - Please call your primary care provider/family doctor to also schedule a follow up appointment

## 2024-01-30 NOTE — DISCHARGE NOTE NURSING/CASE MANAGEMENT/SOCIAL WORK - NSDCPEFALRISK_GEN_ALL_CORE
For information on Fall & Injury Prevention, visit: [email or web address] OR  [email or web address] OR  https://www.cdc.gov/steadi/patient.html

## 2024-01-30 NOTE — PROGRESS NOTE ADULT - SUBJECTIVE AND OBJECTIVE BOX
PETER ALANIS      69y   Male   MRN-[id number]         No Known Allergies             Daily Height in cm: 158.75 (29 Jan 2024 10:39)    Daily Drug Dosing Weight  Height (cm): 158.8 (29 Jan 2024 11:00)  Weight (kg): 59.4 (29 Jan 2024 11:00)  BMI (kg/m2): 23.6 (29 Jan 2024 11:00)  BSA (m2): 1.61 (29 Jan 2024 11:00)    HPI:  68 y/o male PMH HTN HLD prediabetes asthma presents to presurgical testing with diagnosis of other nonspecific abnormal finding of lung field. Pt with history of pulmonary nodules. Pt is scheduled for a right video assisted thoracoscopy robotic assisted wedge resection of right lower lobe mediastinal lymph node dissection.  (18 Jan 2024 17:20)      CHIEF COMPLAINT: Follow up in ICU  for postoperative care of patient who is s/p lung surgery    Procedure: FB, robotic R VATS, lysis of adhesions, RLL wedge resection x2, MLND                       Issues:              Lung nodule              Postop pain              Chest tube in place  Asthma  HLD (hyperlipidemia)  HTN (hypertension)  Prediabetes.  History of lung biopsy      Postop course:     Patient reports moderate pain at chest wall incision sites which is worse with coughing and deep breathing without associated fever or dyspnea. Pain is improved with use of PCA and  oral pain meds.         Home Medications:  Albuterol (Eqv-Proventil HFA) 90 mcg/inh inhalation aerosol: 2 puff(s) inhaled 4 times a day as needed (29 Jan 2024 11:19)  Calcium + vitamin D: 600 - 400 orally 2 times a day (29 Jan 2024 11:19)  losartan 25 mg oral tablet: 1 tab(s) orally once a day (29 Jan 2024 11:19)  Vitamin D: 5000 int unit orally once a week (29 Jan 2024 11:19)    PAST MEDICAL & SURGICAL HISTORY:  Asthma      HLD (hyperlipidemia)      HTN (hypertension)      Prediabetes      Pulmonary nodules      History of lung biopsy        Vital Signs Last 24 Hrs  T(C): 36.8 (30 Jan 2024 08:00), Max: 36.8 (30 Jan 2024 04:00)  T(F): 98.3 (30 Jan 2024 08:00), Max: 98.3 (30 Jan 2024 08:00)  HR: 87 (30 Jan 2024 08:00) (71 - 99)  BP: 120/79 (30 Jan 2024 08:00) (113/70 - 148/96)  BP(mean): 93 (30 Jan 2024 08:00) (80 - 113)  RR: 19 (30 Jan 2024 08:00) (12 - 19)  SpO2: 94% (30 Jan 2024 08:00) (94% - 100%)    Parameters below as of 30 Jan 2024 09:00  Patient On (Oxygen Delivery Method): room air      I&O's Detail    29 Jan 2024 07:01  -  30 Jan 2024 07:00  --------------------------------------------------------  IN:    IV PiggyBack: 100 mL    Lactated Ringers: 360 mL  Total IN: 460 mL    OUT:    Chest Tube (mL): 85 mL    Voided (mL): 600 mL  Total OUT: 685 mL    Total NET: -225 mL        CAPILLARY BLOOD GLUCOSE        Home Medications:  Albuterol (Eqv-Proventil HFA) 90 mcg/inh inhalation aerosol: 2 puff(s) inhaled 4 times a day as needed (29 Jan 2024 11:19)  Calcium + vitamin D: 600 - 400 orally 2 times a day (29 Jan 2024 11:19)  losartan 25 mg oral tablet: 1 tab(s) orally once a day (29 Jan 2024 11:19)  Vitamin D: 5000 int unit orally once a week (29 Jan 2024 11:19)    MEDICATIONS  (STANDING):  albuterol    90 MICROgram(s) HFA Inhaler 2 Puff(s) Inhalation every 6 hours  heparin   Injectable 5000 Unit(s) SubCutaneous every 12 hours  HYDROmorphone PCA (1 mG/mL) 30 milliLiter(s) PCA Continuous PCA Continuous  lactated ringers. 1000 milliLiter(s) (30 mL/Hr) IV Continuous <Continuous>  polyethylene glycol 3350 17 Gram(s) Oral daily  senna 2 Tablet(s) Oral at bedtime    MEDICATIONS  (PRN):  HYDROmorphone PCA (1 mG/mL) Rescue Clinician Bolus 0.5 milliGRAM(s) IV Push every 15 minutes PRN for Pain Scale GREATER THAN 6  naloxone Injectable 0.1 milliGRAM(s) IV Push every 3 minutes PRN For ANY of the following changes in patient status:  A. RR LESS THAN 10 breaths per minute, B. Oxygen saturation LESS THAN 90%, C. Sedation score of 6  ondansetron Injectable 4 milliGRAM(s) IV Push every 6 hours PRN Nausea        Physical exam:                             General:               Pt is awake, alert,  appears to be in pain but not in distress                                                  Neuro:                  Nonfocal                             Psych:                   A&Ox3                          Cardiovascular:   S1 & S2, regular                           Respiratory:         Air entry is fair and equal on both sides, has bilateral conducted sounds                           GI:                          Soft, nondistended and nontender, Bowel sounds active                            Ext:                        No cyanosis or edema     Labs:                                                                           15.2   10.25 )-----------( 219      ( 30 Jan 2024 02:35 )             46.0             01-30    138  |  101  |  21  ----------------------------<  151<H>  5.0   |  22  |  1.05    Ca    8.7      30 Jan 2024 02:35  Phos  3.3     01-30  Mg     2.10     01-30                        Urinalysis Basic - ( 30 Jan 2024 02:35 )    Color: x / Appearance: x / SG: x / pH: x  Gluc: 151 mg/dL / Ketone: x  / Bili: x / Urobili: x   Blood: x / Protein: x / Nitrite: x   Leuk Esterase: x / RBC: x / WBC x   Sq Epi: x / Non Sq Epi: x / Bacteria: x        CXR:    < from: Xray Chest 1 View- PORTABLE-Urgent (Xray Chest 1 View- PORTABLE-Urgent .) (10.04.23 @ 17:42) >  Frontal expiratory view of the chest shows the heart to be normal in   size. The lungs show mild lower left atelectasis and there is no evidence   of pneumothorax nor pleural effusion.    IMPRESSION:  Mild atelectasis.      Plan:  General: 69yMale s/p FB, robotic R VATS, lysis of adhesions, RLL wedge resection x2, MLND, experiencing  pain with deep breathing.                             Neuro:                                         Pain control with Oxy  /  Tylenol PRN / Lidopatch                            Cardiovascular:                                          Telemetry (medical test) - Reviewed by me today independently. Pt is in normal sinus rhythm /  with some PVCs / A-fib.                                          HLD: Diet controlled                                         HTN: Continue hemodynamic monitoring and restart Losartan                                        Continue hemodynamic monitoring to prevent decompensation.                            Respiratory:                                         Postop hypoxemia -Resolved                                              CXR is clear. Encourage incentive spirometry.                                                   Chest PT and frequent suctioning.                                          Asthma: Continue bronchodilators, inhaled steroids, Pulmozyme and inhaled 3% saline inhalations.                                         OOB to chair & ambulate w/ assistance.                                          Continuous pulse oximetry for support & to prevent decompensation.                                         Monitor chest tube output                                         Chest tube to / water seal, to be d/cd                                                                                            GI                                         On puree diet, advance to DASH  diet as tolerated                                         Continue G.I prophylaxis with Protonix                                          Continue Zofran / Reglan for nausea - PRN                                         Continue bowel regimen	                                                                 Renal:                                         Continue LR  30cc/hr                                         Monitor I/Os and electrolytes                                                                                        Hem/ Onc:                                         DVT prophylaxis with SQ Heparin and SCDs                                         Monitor chest tube output &  signs of bleeding.                                                                   Infectious disease:                                            Monitor for fever / leukocytosis.                                          All surgical incision / chest tube  sites look clean                            Endocrine                                             Prediabetes: Continue Accu-Checks with coverage                                                Pertinent clinical, laboratory, radiographic, hemodynamic, echocardiographic, respiratory data, microbiologic data and chart were reviewed and analyzed frequently throughout the course of the day and night. Patient seen, examined and plan discussed with CT Surgeon Dr. Montejo / CTICU team during rounds.  OOB to chair and ambulate with physical therapy as tolerated.   Status discussed with patient and updated plan of care.   I have spent 50 minutes with this patient  including 20 minutes of time monitoring hemodynamic status, respiratory status and  coordinating care in the ICU.        Kameron Booth MD                                                                    
Anesthesia Pain Management Service    SUBJECTIVE: Patient is doing well with IV PCA and no significant problems reported.  ID# [id number] used for Mandarin.    Pain Scale Score	At rest: __2/10_ 	With Activity: ___ 	[X ] Refer to charted pain scores    THERAPY:    [ ] IV PCA Morphine		[ ] 5 mg/mL	[ ] 1 mg/mL  [X ] IV PCA Hydromorphone	[ ] 5 mg/mL	[X ] 1 mg/mL  [ ] IV PCA Fentanyl		[ ] 50 micrograms/mL    Demand dose __0.2_ lockout __6_ (minutes) Continuous Rate _0__ Total: __0.7_   mg used (in past 24 hrs)      MEDICATIONS  (STANDING):  acetaminophen     Tablet .. 650 milliGRAM(s) Oral every 6 hours  albuterol    90 MICROgram(s) HFA Inhaler 2 Puff(s) Inhalation every 6 hours  heparin   Injectable 5000 Unit(s) SubCutaneous every 12 hours  lactated ringers. 1000 milliLiter(s) (30 mL/Hr) IV Continuous <Continuous>  polyethylene glycol 3350 17 Gram(s) Oral daily  senna 2 Tablet(s) Oral at bedtime    MEDICATIONS  (PRN):  naloxone Injectable 0.1 milliGRAM(s) IV Push every 3 minutes PRN For ANY of the following changes in patient status:  A. RR LESS THAN 10 breaths per minute, B. Oxygen saturation LESS THAN 90%, C. Sedation score of 6  ondansetron Injectable 4 milliGRAM(s) IV Push every 6 hours PRN Nausea  oxyCODONE    IR 2.5 milliGRAM(s) Oral every 3 hours PRN Moderate Pain (4 - 6)  oxyCODONE    IR 5 milliGRAM(s) Oral every 3 hours PRN Severe Pain (7 - 10)      OBJECTIVE: Patient laying in bed.    Sedation Score:	[ X] Alert	[ ] Drowsy 	[ ] Arousable	[ ] Asleep	[ ] Unresponsive    Side Effects:	[X ] None	[ ] Nausea	[ ] Vomiting	[ ] Pruritus  		[ ] Other:    Vital Signs Last 24 Hrs  T(C): 36.8 (30 Jan 2024 08:00), Max: 36.8 (30 Jan 2024 04:00)  T(F): 98.3 (30 Jan 2024 08:00), Max: 98.3 (30 Jan 2024 08:00)  HR: 97 (30 Jan 2024 11:00) (78 - 99)  BP: 124/69 (30 Jan 2024 11:00) (110/74 - 148/96)  BP(mean): 85 (30 Jan 2024 11:00) (80 - 113)  RR: 16 (30 Jan 2024 11:00) (12 - 19)  SpO2: 93% (30 Jan 2024 11:00) (93% - 100%)    Parameters below as of 30 Jan 2024 12:00  Patient On (Oxygen Delivery Method): room air        ASSESSMENT/ PLAN    Therapy to  be:	[ ] Continue   [ X] Discontinued   [X ] Change to prn Analgesics    Documentation and Verification of current medications:   [X] Done	[ ] Not done, not elligible    Comments: IV PCA discontinued. PRN Oral/IV opioids and/or Adjuvant non-opioid medication to be ordered at this point.    Progress Note written now but Patient was seen earlier.

## 2024-01-30 NOTE — DISCHARGE NOTE PROVIDER - CARE PROVIDER_API CALL
Mj Montejo  Thoracic Surgery  [id number] [place] [address], [address] [place] [address]  Phone: [phone]  Fax: [phone]  Follow Up Time:    Anesthesia Type: 1% lidocaine with 1:100,000 epinephrine and a 1:10 solution of 8.4% sodium bicarbonate

## 2024-01-30 NOTE — DISCHARGE NOTE PROVIDER - CARE PROVIDERS DIRECT ADDRESSES
Called patient and left VM to call the office back.
Called patient and left VM to call the office back.
Patient is asking to discontinue taking the Zoloft  safely. Patient states she has 4  tabs left of Zoloft 50 mg. Patient has a refill remaining  at the pharmacy if  she needs to use to taper off.  May leave [facility] # [phone]
Patient wants to speak to a nurse. Stating she wants to discontinue the Zoloft. She refused to give any other details as to why. Just states there isn't really a specific reason.
Pt is returning call
Pt reached and made aware of message per Dr. Vilma Olson.  Octro message also sent to pt per request.
She can   take Zoloft 50mg 1/2 tab x1 week   then   take Zoloft 1/2 tab Mon Wed Fri  then  take Zoloft 1/2 tab Tues and Thurs  then  Stop    If she starts experiencing side effects ie moodiness, mental changes, she can continue the prior dosing and schedule an appt with me. She can also be seen at the nearest ED.  Thank you
[email or web address]

## 2024-01-30 NOTE — DISCHARGE NOTE PROVIDER - DID THE PATIENT PRESENT WITH OR WAS TREATED FOR MALNUTRITION DURING THIS ADMISSION
[facility] Brief Operative Note    Pre-operative diagnosis: djd   Post-operative diagnosis sAME   Procedure: Procedure(s):  RIGHT TOTAL HIP ARTHROPLASTY  - Wound Class: I-Clean   Surgeon(s): Surgeon(s) and Role:     * Chacho Cates MD - Primary   Estimated blood loss: 250 mL    Specimens:    Findings:      
No

## 2024-01-31 PROCEDURE — 71045 X-RAY EXAM CHEST 1 VIEW: CPT | Mod: 26

## 2024-02-01 PROBLEM — I10 ESSENTIAL (PRIMARY) HYPERTENSION: Chronic | Status: ACTIVE | Noted: 2024-01-18

## 2024-02-01 PROBLEM — R91.8 OTHER NONSPECIFIC ABNORMAL FINDING OF LUNG FIELD: Chronic | Status: ACTIVE | Noted: 2024-01-18

## 2024-02-01 PROBLEM — R73.03 PREDIABETES: Chronic | Status: ACTIVE | Noted: 2024-01-18

## 2024-02-05 LAB — SURGICAL PATHOLOGY STUDY: SIGNIFICANT CHANGE UP

## 2024-02-14 PROBLEM — C34.91 ADENOCARCINOMA OF LUNG, RIGHT: Status: ACTIVE | Noted: 2024-02-14

## 2024-02-15 ENCOUNTER — APPOINTMENT (OUTPATIENT)
Dept: THORACIC SURGERY | Facility: CLINIC | Age: 69
End: 2024-02-15
Payer: MEDICARE

## 2024-02-15 VITALS
WEIGHT: 125 LBS | OXYGEN SATURATION: 98 % | HEART RATE: 94 BPM | SYSTOLIC BLOOD PRESSURE: 136 MMHG | DIASTOLIC BLOOD PRESSURE: 76 MMHG | BODY MASS INDEX: 22.15 KG/M2 | RESPIRATION RATE: 18 BRPM | TEMPERATURE: 97 F | HEIGHT: 63 IN

## 2024-02-15 DIAGNOSIS — C34.91 MALIGNANT NEOPLASM OF UNSPECIFIED PART OF RIGHT BRONCHUS OR LUNG: ICD-10-CM

## 2024-02-15 PROCEDURE — 99024 POSTOP FOLLOW-UP VISIT: CPT

## 2024-02-15 NOTE — ASSESSMENT
[FreeTextEntry1] : Mr. PETER ALANIS, 69 year old male, former smoker (1ppd since age 14, quit July 2023), w/ no significant PMHx, who presented with lung nodules. CT lung cancer screening ordered due to smoking hx, referred by PCP Dr. Heath Yepez.  CT Chest on 8/21/23 at [facility]: - mildly enlarged mediastinal lymph nodes unchanged: subcarinal lymph node measuring 1.5 cm; a 1.3 cm precarinal lymph node (2: 56). There is no hilar lymphadenopathy. - numerous small lung nodules in the lung apices, measuring up to 4 mm (3: 27) they are not significantly changed in size and number, likely postinflammatory/infectious. - small subpleural nodule seen more inferiorly in the upper lobes: stable 3 mm nodule CL (3: 56) - a faint 8 mm subsolid RLL nodule (3: 161), predominantly groundglass with a punctate nodular component, previously measuring 6 mm, increased in size and density, suspicious for low-grade malignancy - mild emphysema - a hepatic cyst - No suspicious lytic or blastic lesion is identified. 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 Although nonspecific these are typical of inflammation associated with prior granulomatous disease. - Again noted is an 8 mm subsolid pulmonary nodule in the right lower lobe on image 74, no significant uptake on PET. - Again noted are multiple subcentimeter pulmonary nodules measuring up to 4 mm. These are too small for the resolution of PET although likely inflammatory in nature  Patient is s/p FB with BAL of RLL bronchus. EBUS, FNA of right level IV LNs. FNA of Level VII LNs. FB with aspiration of secretions on 10/04/2023. Path of 4R, Level 7 LN and RLL BAL revealed negative for malignant cells.  Last seen 10/12/23, I recommended a Rt VATS R.A. RLL wedge rxn, MLND, but patient is returning to [place] next week and returning to [place] in Feb 2024, I recommended patient to repeat CT Chest w/o contrast in Feb 2024, then RTC to discuss and set a date for surgery.  Now s/p Rt VATS, R.A, wedge rxn of RLL and tear, MLND on 1/29/24. Path revealed RLL AdenoCA in situ, nonmucinous, 1 cm, G1, all margins and LNs are negative, [id number], Stg 0  CXR today revealed no acute disease.  He is recovering well from the surgery, denies fever, chills, cough, SOB, CP, incisional pain.   I have reviewed the patient's medical records and diagnostic images at time of this office consultation and have made the following recommendation: 1. Pathology report reviewed with pt today.  2. RTC in 6 months with CT chest w/o contrast.     The case has been discussed with Dr. Mj Montejo, who has reviewed the imaging and agreed with above recommendations.

## 2024-02-15 NOTE — PHYSICAL EXAM
[] : no respiratory distress [Exaggerated Use Of Accessory Muscles For Inspiration] : no accessory muscle use [Respiration, Rhythm And Depth] : normal respiratory rhythm and effort [Auscultation Breath Sounds / Voice Sounds] : lungs were clear to auscultation bilaterally [Apical Impulse] : the apical impulse was normal [Heart Rate And Rhythm] : heart rate was normal and rhythm regular [Heart Sounds] : normal S1 and S2 [Heart Sounds Gallop] : no gallops [Site: ___] : Site: [unfilled] [Clean] : clean [Dry] : dry [Healing Well] : healing well [Bleeding] : no active bleeding [Foul Odor] : no foul smell [Purulent Drainage] : no purulent drainage [Serosanguinous Drainage] : no serosanguinous drainage [Erythema] : not erythematous [Warm] : not warm [Tender] : not tender [No Edema] : no edema [Pitting Edema] : no pitting edema present

## 2024-02-15 NOTE — CONSULT LETTER
[Dear  ___] : Dear  [unfilled], [Consult Letter:] : I had the pleasure of evaluating your patient, [unfilled]. [( Thank you for referring [unfilled] for consultation for _____ )] : Thank you for referring [unfilled] for consultation for [unfilled] [Please see my note below.] : Please see my note below. [Consult Closing:] : Thank you very much for allowing me to participate in the care of this patient.  If you have any questions, please do not hesitate to contact me. [Sincerely,] : Sincerely, [DrKarena  ___] : Dr. SMITH [FreeTextEntry2] : Dr. Heath Yepez (PCP/Referring) Dr. Mike Nesbitt (Pulm) [FreeTextEntry3] : Mj Montejo MD, MPH  System Director of Thoracic Surgery  Director of Comprehensive Lung and Foregut [place]  Professor Cardiovascular & Thoracic Surgery   [facility] [employer] at [facility] [phone] [place] [address] [place] [address] Tel: [phone] Fax: [phone]

## 2024-02-29 ENCOUNTER — APPOINTMENT (OUTPATIENT)
Dept: THORACIC SURGERY | Facility: CLINIC | Age: 69
End: 2024-02-29

## 2024-08-29 ENCOUNTER — APPOINTMENT (OUTPATIENT)
Dept: THORACIC SURGERY | Facility: CLINIC | Age: 69
End: 2024-08-29
Payer: MEDICARE

## 2024-08-29 VITALS
DIASTOLIC BLOOD PRESSURE: 80 MMHG | RESPIRATION RATE: 16 BRPM | HEART RATE: 62 BPM | OXYGEN SATURATION: 99 % | WEIGHT: 132.7 LBS | SYSTOLIC BLOOD PRESSURE: 153 MMHG | TEMPERATURE: 97.7 F | BODY MASS INDEX: 23.51 KG/M2

## 2024-08-29 DIAGNOSIS — C34.91 MALIGNANT NEOPLASM OF UNSPECIFIED PART OF RIGHT BRONCHUS OR LUNG: ICD-10-CM

## 2024-08-29 PROCEDURE — 99213 OFFICE O/P EST LOW 20 MIN: CPT

## 2024-08-30 NOTE — HISTORY OF PRESENT ILLNESS
[FreeTextEntry1] : Mr. PETER ALANIS, 69 year old male, former smoker (1ppd since age 14, quit July 2023), w/ no significant PMHx, who presented with lung nodules. CT lung cancer screening ordered due to smoking hx, referred by PCP Dr. Heath Yepez.  CT Chest on 8/21/23 at [facility]: - mildly enlarged mediastinal lymph nodes unchanged: subcarinal lymph node measuring 1.5 cm; a 1.3 cm precarinal lymph node (2: 56). There is no hilar lymphadenopathy. - numerous small lung nodules in the lung apices, measuring up to 4 mm (3: 27) they are not significantly changed in size and number, likely postinflammatory/infectious. - small subpleural nodule seen more inferiorly in the upper lobes: stable 3 mm nodule CL (3: 56) - a faint 8 mm subsolid RLL nodule (3: 161), predominantly groundglass with a punctate nodular component, previously measuring 6 mm, increased in size and density, suspicious for low-grade malignancy - mild emphysema - a hepatic cyst - No suspicious lytic or blastic lesion is identified. 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 Although nonspecific these are typical of inflammation associated with prior granulomatous disease. - Again noted is an 8 mm subsolid pulmonary nodule in the right lower lobe on image 74, no significant uptake on PET. - Again noted are multiple subcentimeter pulmonary nodules measuring up to 4 mm. These are too small for the resolution of PET although likely inflammatory in nature  Patient is s/p FB with BAL of RLL bronchus. EBUS, FNA of right level IV LNs. FNA of Level VII LNs. FB with aspiration of secretions on 10/04/2023. Path of 4R, Level 7 LN and RLL BAL revealed negative for malignant cells.  Patient was seen 10/12/23, I recommended a Rt VATS R.A. RLL wedge rxn, MLND, but patient is returning to [place] next week and returning to [place] in Feb 2024, I recommended patient to repeat CT Chest w/o contrast in Feb 2024, then RTC to discuss and set a date for surgery.  Now 6 mo s/p Rt VATS, R.A, wedge rxn of RLL and tear, MLND on 1/29/24. Path revealed RLL AdenoCA in situ, nonmucinous, 1 cm, G1, all margins and LNs are negative, [id number], Stg 0  CT Chest on 08/23/2024:  - Mild emphysematous changes are present in both lungs. Mild biapical pleural thickening is present. New postsurgical changes are present in the posterior right lower lobe. The previously identified groundglass nodule in this area is no longer present. Minimal soft tissue thickening is present along this surgical suture site, a normal finding after surgery. A few stable pulmonary nodules are present, including: > Cluster of nodules in the left lung apex which measured 0.4 cm (series 3 image 28) > 0.4 cm nodule in the right upper lobe (series 3 image 104). - Mild diffuse bronchial wall thickening is present. A few scattered foci of mucous plugging are present within both lungs. - There is a single mildly prominent right paratracheal lymph node which measures 1.1 cm short axis (series 2 image 54), unchanged in appearance since the previous examination.   **PHQ-2 completed on 08/29/2024.   Patient is here today for a follow up. Patient denies shortness of breath, cough, chest pain, fever, chills.

## 2024-08-30 NOTE — ASSESSMENT
[FreeTextEntry1] : Mr. PETER ALANIS, 69 year old male, former smoker (1ppd since age 14, quit July 2023), w/ no significant PMHx, who presented with lung nodules. CT lung cancer screening ordered due to smoking hx, referred by PCP Dr. Heath Yepez.  Now s/p Rt VATS, R.A, wedge rxn of RLL and tear, MLND on 1/29/24. Path revealed RLL AdenoCA in situ, nonmucinous, 1 cm, G1, all margins and LNs are negative, [id number], Stg 0  I have reviewed the patient's medical records and diagnostic images at time of this office consultation and have made the following recommendation: 1. CT chest reviewed and explained to patient, post-op change, PEDRO, I recommended patient to return to office in 6 months with CT Chest without contrast.   I, RAMÓN Vicente, personally performed the evaluation and management (E/M) services for this established patient who follow up today with an existing condition.  That E/M includes conducting the examination, assessing all new/exacerbated/existing conditions, and establishing a plan of care.  Today, my ACP, LEWIS AshfordC, was here to observe my evaluation and management services for this existing condition to be followed going forward.

## 2024-08-30 NOTE — CONSULT LETTER
[Dear  ___] : Dear  [unfilled], [Consult Letter:] : I had the pleasure of evaluating your patient, [unfilled]. [( Thank you for referring [unfilled] for consultation for _____ )] : Thank you for referring [unfilled] for consultation for [unfilled] [Please see my note below.] : Please see my note below. [Consult Closing:] : Thank you very much for allowing me to participate in the care of this patient.  If you have any questions, please do not hesitate to contact me. [Sincerely,] : Sincerely, [DrKarena  ___] : Dr. SMITH [FreeTextEntry2] : Dr. Heath Yepez (PCP/Referring) Dr. Mike Nesbitt (Pulm) [FreeTextEntry3] : Mj Montejo MD, MPH  System Director of Thoracic Surgery  Director of Comprehensive Lung and Foregut [place]  Professor Cardiovascular & Thoracic Surgery   [facility] [employer] at [facility] [phone] [place] [address] [place] [address] Tel: [phone] Fax: [phone]

## 2024-08-30 NOTE — CONSULT LETTER
[Dear  ___] : Dear  [unfilled], [Consult Letter:] : I had the pleasure of evaluating your patient, [unfilled]. [( Thank you for referring [unfilled] for consultation for _____ )] : Thank you for referring [unfilled] for consultation for [unfilled] [Please see my note below.] : Please see my note below. [Consult Closing:] : Thank you very much for allowing me to participate in the care of this patient.  If you have any questions, please do not hesitate to contact me. [Sincerely,] : Sincerely, [DrKarena  ___] : Dr. SMITH [FreeTextEntry2] : Dr. Heath Yepez (PCP/Referring) Dr. Mike Nesbitt (Pulm) [FreeTextEntry3] : Mj Montejo MD, MPH  System Director of Thoracic Surgery  Director of Comprehensive Lung and Foregut [place]  Professor Cardiovascular & Thoracic Surgery   [facility] [employer] [facility] at [facility] [phone] [place] [address] [place] [address] Tel: [phone] Fax: [phone]

## 2024-08-30 NOTE — CONSULT LETTER
[Dear  ___] : Dear  [unfilled], [Consult Letter:] : I had the pleasure of evaluating your patient, [unfilled]. [( Thank you for referring [unfilled] for consultation for _____ )] : Thank you for referring [unfilled] for consultation for [unfilled] [Please see my note below.] : Please see my note below. [Consult Closing:] : Thank you very much for allowing me to participate in the care of this patient.  If you have any questions, please do not hesitate to contact me. [Sincerely,] : Sincerely, [DrKarena  ___] : Dr. SMITH [FreeTextEntry2] : Dr. Heath Yepez (PCP/Referring) Dr. Mike Nesbitt (Pulm) [FreeTextEntry3] : Mj Montejo MD, MPH  System Director of Thoracic Surgery  Director of Comprehensive Lung and Foregut [place]  Professor Cardiovascular & Thoracic Surgery   [facility] at [facility] [phone] [place] [address] [place] [address] Tel: [phone] Fax: [phone]

## 2024-08-30 NOTE — HISTORY OF PRESENT ILLNESS
[FreeTextEntry1] : Mr. PETER ALANIS, 69 year old male, former smoker (1ppd since age 14, quit July 2023), w/ no significant PMHx, who presented with lung nodules. CT lung cancer screening ordered due to smoking hx, referred by PCP Dr. Heath Yepez.  [place] 8[place]21[place]23 [place] [facility] [place] - mildly enlarged mediastinal lymph nodes unchanged: subcarinal lymph node measuring 1.5 cm; a 1.3 cm precarinal lymph node (2: 56). There is no hilar lymphadenopathy. - numerous small lung nodules in the lung apices, measuring up to 4 mm (3: 27) they are not significantly changed in size and number, likely postinflammatory/infectious. - small subpleural nodule seen more inferiorly in the upper lobes: stable 3 mm nodule CL (3: 56) - a faint 8 mm subsolid RLL nodule (3: 161), predominantly groundglass with a punctate nodular component, previously measuring 6 mm, increased in size and density, suspicious for low-grade malignancy - mild emphysema - a hepatic cyst - No suspicious lytic or blastic lesion is identified. 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 Although nonspecific these are typical of inflammation associated with prior granulomatous disease. - Again noted is an 8 mm subsolid pulmonary nodule in the right lower lobe on image 74, no significant uptake on PET. - Again noted are multiple subcentimeter pulmonary nodules measuring up to 4 mm. These are too small for the resolution of PET although likely inflammatory in nature  Patient is s/p FB with BAL of RLL bronchus. EBUS, FNA of right level IV LNs. FNA of Level VII LNs. FB with aspiration of secretions on 10/04/2023. Path of 4R, Level 7 LN and RLL BAL revealed negative for malignant cells.  Patient was seen 10/12/23, I recommended a Rt VATS R.A. RLL wedge rxn, MLND, but patient is returning to [place] next week and returning to [place] in Feb 2024, I recommended patient to repeat CT Chest w/o contrast in Feb 2024, then RTC to discuss and set a date for surgery.  Now 6 mo s/p Rt VATS, R.A, wedge rxn of RLL and tear, MLND on 1/29/24. Path revealed RLL AdenoCA in situ, nonmucinous, 1 cm, G1, all margins and LNs are negative, [id number], Stg 0  CT Chest on 08/23/2024:  - Mild emphysematous changes are present in both lungs. Mild biapical pleural thickening is present. New postsurgical changes are present in the posterior right lower lobe. The previously identified groundglass nodule in this area is no longer present. Minimal soft tissue thickening is present along this surgical suture site, a normal finding after surgery. A few stable pulmonary nodules are present, including: > Cluster of nodules in the left lung apex which measured 0.4 cm (series 3 image 28) > 0.4 cm nodule in the right upper lobe (series 3 image 104). - Mild diffuse bronchial wall thickening is present. A few scattered foci of mucous plugging are present within both lungs. - There is a single mildly prominent right paratracheal lymph node which measures 1.1 cm short axis (series 2 image 54), unchanged in appearance since the previous examination.   **PHQ-2 completed on 08/29/2024.   Patient is here today for a follow up. Patient denies shortness of breath, cough, chest pain, fever, chills.

## 2024-08-30 NOTE — ASSESSMENT
[FreeTextEntry1] : Mr. PETER ALANIS, 69 year old male, former smoker (1ppd since age 14, quit July 2023), w/ no significant PMHx, who presented with lung nodules. CT lung cancer screening ordered due to smoking hx, referred by PCP Dr. Heath Yepez.  Now s/p Rt VATS, R.A, wedge rxn of RLL and tear, MLND on 1/29/24. Path revealed RLL AdenoCA in situ, nonmucinous, 1 cm, G1, all margins and LNs are negative, [id number], Stg 0  I have reviewed the patient's medical records and diagnostic images at time of this office consultation and have made the following recommendation: 1. CT chest reviewed and explained to patient, post-op change, PEDRO, I recommended patient to return to office in 6 months with CT Chest without contrast.   I, RAMÓN Vicente, personally performed the evaluation and management (E/M) services for this established patient who follow up today with an existing condition.  That E/M includes conducting the examination, assessing all new/exacerbated/existing conditions, and establishing a plan of care.  Today, my ACP, LEWIS AshfordC, was here to observe my evaluation and management services for this existing condition to be followed going forward.  Yes Yes

## 2024-08-30 NOTE — CONSULT LETTER
[Dear  ___] : Dear  [unfilled], [Consult Letter:] : I had the pleasure of evaluating your patient, [unfilled]. [( Thank you for referring [unfilled] for consultation for _____ )] : Thank you for referring [unfilled] for consultation for [unfilled] [Please see my note below.] : Please see my note below. [Consult Closing:] : Thank you very much for allowing me to participate in the care of this patient.  If you have any questions, please do not hesitate to contact me. [Sincerely,] : Sincerely, [DrKarena  ___] : Dr. SMITH [FreeTextEntry2] : Dr. Heath Yepez (PCP/Referring) Dr. Mike Nesbitt (Pulm) [FreeTextEntry3] : Mj Montejo MD, MPH  System Director of Thoracic Surgery  Director of Comprehensive Lung and Foregut [place]  Professor Cardiovascular & Thoracic Surgery   [facility] at [facility] [phone] [place] [address] [place] [address] Tel: [phone] Fax: [phone]

## 2024-08-30 NOTE — HISTORY OF PRESENT ILLNESS
[FreeTextEntry1] : Mr. PETER ALANIS, 69 year old male, former smoker (1ppd since age 14, quit July 2023), w/ no significant PMHx, who presented with lung nodules. CT lung cancer screening ordered due to smoking hx, referred by PCP Dr. Heath Yepez.  CT Chest on 8/21/23 at [facility] [place]: - mildly enlarged mediastinal lymph nodes unchanged: subcarinal lymph node measuring 1.5 cm; a 1.3 cm precarinal lymph node (2: 56). There is no hilar lymphadenopathy. - numerous small lung nodules in the lung apices, measuring up to 4 mm (3: 27) they are not significantly changed in size and number, likely postinflammatory/infectious. - small subpleural nodule seen more inferiorly in the upper lobes: stable 3 mm nodule CL (3: 56) - a faint 8 mm subsolid RLL nodule (3: 161), predominantly groundglass with a punctate nodular component, previously measuring 6 mm, increased in size and density, suspicious for low-grade malignancy - mild emphysema - a hepatic cyst - No suspicious lytic or blastic lesion is identified. 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 Although nonspecific these are typical of inflammation associated with prior granulomatous disease. - Again noted is an 8 mm subsolid pulmonary nodule in the right lower lobe on image 74, no significant uptake on PET. - Again noted are multiple subcentimeter pulmonary nodules measuring up to 4 mm. These are too small for the resolution of PET although likely inflammatory in nature  Patient is s/p FB with BAL of RLL bronchus. EBUS, FNA of right level IV LNs. FNA of Level VII LNs. FB with aspiration of secretions on 10/04/2023. Path of 4R, Level 7 LN and RLL BAL revealed negative for malignant cells.  Patient was seen 10/12/23, I recommended a Rt VATS R.A. RLL wedge rxn, MLND, but patient is returning to [place] next week and returning to [place] in Feb 2024, I recommended patient to repeat CT Chest w/o contrast in Feb 2024, then RTC to discuss and set a date for surgery.  Now 6 mo s/p Rt VATS, R.A, wedge rxn of RLL and tear, MLND on 1/29/24. Path revealed RLL AdenoCA in situ, nonmucinous, 1 cm, G1, all margins and LNs are negative, [id number], Stg 0  CT Chest on 08/23/2024:  - Mild emphysematous changes are present in both lungs. Mild biapical pleural thickening is present. New postsurgical changes are present in the posterior right lower lobe. The previously identified groundglass nodule in this area is no longer present. Minimal soft tissue thickening is present along this surgical suture site, a normal finding after surgery. A few stable pulmonary nodules are present, including: > Cluster of nodules in the left lung apex which measured 0.4 cm (series 3 image 28) > 0.4 cm nodule in the right upper lobe (series 3 image 104). - Mild diffuse bronchial wall thickening is present. A few scattered foci of mucous plugging are present within both lungs. - There is a single mildly prominent right paratracheal lymph node which measures 1.1 cm short axis (series 2 image 54), unchanged in appearance since the previous examination.   **PHQ-2 completed on 08/29/2024.   Patient is here today for a follow up. Patient denies shortness of breath, cough, chest pain, fever, chills.

## 2024-08-30 NOTE — ASSESSMENT
[FreeTextEntry1] : Mr. PETER ALANIS, 69 year old male, former smoker (1ppd since age 14, quit July 2023), w/ no significant PMHx, who presented with lung nodules. CT lung cancer screening ordered due to smoking hx, referred by PCP Dr. Heath Yepez.  Now s/p Rt VATS, R.A, wedge rxn of RLL and tear, MLND on 1/29/24. Path revealed RLL AdenoCA in situ, nonmucinous, 1 cm, G1, all margins and LNs are negative, [id number], Stg 0  I have reviewed the patient's medical records and diagnostic images at time of this office consultation and have made the following recommendation: 1. CT chest reviewed and explained to patient, post-op change, PEDRO, I recommended patient to return to office in 6 months with CT Chest without contrast.   I, RAMÓN Vicente, personally performed the evaluation and management (E/M) services for this established patient who follow up today with an existing condition.  That E/M includes conducting the examination, assessing all new/exacerbated/existing conditions, and establishing a plan of care.  Today, my ACP, LEWIS AshfordC, was here to observe my evaluation and management services for this existing condition to be followed going forward.

## 2024-08-30 NOTE — HISTORY OF PRESENT ILLNESS
[FreeTextEntry1] : Mr. PETER ALANIS, 69 year old male, former smoker (1ppd since age 14, quit July 2023), w/ no significant PMHx, who presented with lung nodules. CT lung cancer screening ordered due to smoking hx, referred by PCP Dr. Heath Yepez.  CT Chest on 8/21/23 at [facility] [place]: - mildly enlarged mediastinal lymph nodes unchanged: subcarinal lymph node measuring 1.5 cm; a 1.3 cm precarinal lymph node (2: 56). There is no hilar lymphadenopathy. - numerous small lung nodules in the lung apices, measuring up to 4 mm (3: 27) they are not significantly changed in size and number, likely postinflammatory/infectious. - small subpleural nodule seen more inferiorly in the upper lobes: stable 3 mm nodule CL (3: 56) - a faint 8 mm subsolid RLL nodule (3: 161), predominantly groundglass with a punctate nodular component, previously measuring 6 mm, increased in size and density, suspicious for low-grade malignancy - mild emphysema - a hepatic cyst - No suspicious lytic or blastic lesion is identified. 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 Although nonspecific these are typical of inflammation associated with prior granulomatous disease. - Again noted is an 8 mm subsolid pulmonary nodule in the right lower lobe on image 74, no significant uptake on PET. - Again noted are multiple subcentimeter pulmonary nodules measuring up to 4 mm. These are too small for the resolution of PET although likely inflammatory in nature  Patient is s/p FB with BAL of RLL bronchus. EBUS, FNA of right level IV LNs. FNA of Level VII LNs. FB with aspiration of secretions on 10/04/2023. Path of 4R, Level 7 LN and RLL BAL revealed negative for malignant cells.  Patient was seen 10/12/23, I recommended a Rt VATS R.A. RLL wedge rxn, MLND, but patient is returning to [place] next week and returning to [place] in Feb 2024, I recommended patient to repeat CT Chest w/o contrast in Feb 2024, then RTC to discuss and set a date for surgery.  Now 6 mo s/p Rt VATS, R.A, wedge rxn of RLL and tear, MLND on 1/29/24. Path revealed RLL AdenoCA in situ, nonmucinous, 1 cm, G1, all margins and LNs are negative, [id number], Stg 0  CT Chest on 08/23/2024:  - Mild emphysematous changes are present in both lungs. Mild biapical pleural thickening is present. New postsurgical changes are present in the posterior right lower lobe. The previously identified groundglass nodule in this area is no longer present. Minimal soft tissue thickening is present along this surgical suture site, a normal finding after surgery. A few stable pulmonary nodules are present, including: > Cluster of nodules in the left lung apex which measured 0.4 cm (series 3 image 28) > 0.4 cm nodule in the right upper lobe (series 3 image 104). - Mild diffuse bronchial wall thickening is present. A few scattered foci of mucous plugging are present within both lungs. - There is a single mildly prominent right paratracheal lymph node which measures 1.1 cm short axis (series 2 image 54), unchanged in appearance since the previous examination.   **PHQ-2 completed on 08/29/2024.   Patient is here today for a follow up. Patient denies shortness of breath, cough, chest pain, fever, chills.

## 2024-08-30 NOTE — ASSESSMENT
[FreeTextEntry1] : Mr. PETER ALANIS, 69 year old male, former smoker (1ppd since age 14, quit July 2023), w/ no significant PMHx, who presented with lung nodules. CT lung cancer screening ordered due to smoking hx, referred by PCP Dr. Heath Yepez.  Now s/p Rt VATS, R.A, wedge rxn of RLL and tear, MLND on 1/29/24. Path revealed RLL AdenoCA in situ, nonmucinous, 1 cm, G1, all margins and LNs are negative, [id number], Stg 0  I have reviewed the patient's medical records and diagnostic images at time of this office consultation and have made the following recommendation: 1. CT chest reviewed and explained to patient, post-op change, PEDRO, I recommended patient to return to office in 6 months with CT Chest without contrast.   I, RAMÓN Vicente, personally performed the evaluation and management (E/M) services for this established patient who follow up today with an existing condition.  That E/M includes conducting the examination, assessing all new/exacerbated/existing conditions, and establishing a plan of care.  Today, my ACP, LEWIS AshfordC, was here to observe my evaluation and management services for this existing condition to be followed going forward.

## 2025-03-13 ENCOUNTER — APPOINTMENT (OUTPATIENT)
Dept: THORACIC SURGERY | Facility: CLINIC | Age: 70
End: 2025-03-13

## (undated) DEVICE — DRSG CURITY GAUZE SPONGE 4 X 4" 12-PLY

## (undated) DEVICE — SUT VICRYL 0 27" UR-6

## (undated) DEVICE — TROCAR COVIDIEN VERSASTEP PLUS 15MM STANDARD

## (undated) DEVICE — SYR SLIP 10CC

## (undated) DEVICE — D HELP - CLEARVIEW CLEARIFY SYSTEM

## (undated) DEVICE — CHEST DRAIN PLEUR-EVAC DRY/WET ADULT-PEDS SINGLE (QUICK)

## (undated) DEVICE — GRASPER LAPA 5MMX35CM

## (undated) DEVICE — NDL HYPO REGULAR BEVEL 22G X 1.5" (TURQUOISE)

## (undated) DEVICE — TROCAR COVIDIEN VERSAPORT BLADELESS OPTICAL 15MM STANDARD

## (undated) DEVICE — SUT PROLENE 0 30" CT-1

## (undated) DEVICE — VALVE SUCTION EVIS 160/200/240

## (undated) DEVICE — DRSG GAUZE PACKTNER ROLL

## (undated) DEVICE — ELCTR BOVIE PENCIL SMOKE EVACUATION

## (undated) DEVICE — SOL ANTI FOG

## (undated) DEVICE — SOL IRR POUR NS 0.9% 500ML

## (undated) DEVICE — NDL ASPIRATION VIZISHOT2 21G

## (undated) DEVICE — ADAPTER FIBEROPTIC BRONCHOSCOPE DUAL AXIS SWIVEL

## (undated) DEVICE — TUBING SUCTION 20FT

## (undated) DEVICE — SOL INJ LR 1000ML

## (undated) DEVICE — STAPLER COVIDIEN ENDO GIA STANDARD HANDLE

## (undated) DEVICE — XI DRAPE COLUMN

## (undated) DEVICE — TROCAR COVIDIEN VERSAONE BLADELESS FIXATION 12MM STANDARD

## (undated) DEVICE — NDL ASPIRATION 22G W SYR

## (undated) DEVICE — TUBING OLYMPUS INSUFFLATION

## (undated) DEVICE — DRSG MASTISOL

## (undated) DEVICE — VENODYNE/SCD SLEEVE CALF MEDIUM

## (undated) DEVICE — POSITIONER STRAP ARMBOARD VELCRO TS-30

## (undated) DEVICE — GLV 8 PROTEXIS (WHITE)

## (undated) DEVICE — BALLOON SINGLE FOR BF-UC160F

## (undated) DEVICE — PACK ROBOTIC LIJ

## (undated) DEVICE — DRAPE 3/4 SHEET 52X76"

## (undated) DEVICE — CHEST DRAIN OASIS DRY SUCTION WATER SEAL

## (undated) DEVICE — DRSG TEGADERM 4X4.75"

## (undated) DEVICE — SUT VICRYL 4-0 27" PS-2 UNDYED

## (undated) DEVICE — ELCTR BOVIE TIP BLADE INSULATED 2.75" EDGE

## (undated) DEVICE — XI OBTURATOR OPTICAL BLADELESS 8MM

## (undated) DEVICE — SUT SILK 2-0 30" SH

## (undated) DEVICE — TUBING SUCTION NONCONDUCTIVE 6MM X 12FT

## (undated) DEVICE — ELCTR BOVIE TIP BLADE INSULATED 6.5" EDGE

## (undated) DEVICE — GOWN XL

## (undated) DEVICE — VALVE BIOPSY BRONCHOVIDEOSCOPE

## (undated) DEVICE — ENDOCATCH GENERAL 15MM (PURPLE)

## (undated) DEVICE — SYR LUER LOK 20CC

## (undated) DEVICE — BLADE SURGICAL #10 STAINLESS

## (undated) DEVICE — XI SEAL UNIVERSIAL 5-12MM

## (undated) DEVICE — WARMING BLANKET LOWER ADULT

## (undated) DEVICE — STERIS DEFENDO 3-PIECE KIT (AIR/WATER, SUCTION & BIOPSY VALVES)

## (undated) DEVICE — POSITIONER FOAM EGG CRATE ULNAR 2PCS (PINK)

## (undated) DEVICE — MARKING PEN W RULER

## (undated) DEVICE — ENDOCATCH GENERAL 10MM (PURPLE)

## (undated) DEVICE — DRSG TELFA 3 X 8

## (undated) DEVICE — TUBING STRYKEFLOW II SUCTION / IRRIGATOR

## (undated) DEVICE — XI ARM FORCEP FENESTRATED BIPOLAR 8MM

## (undated) DEVICE — XI DRAPE ARM